# Patient Record
Sex: FEMALE | Race: BLACK OR AFRICAN AMERICAN | NOT HISPANIC OR LATINO | Employment: FULL TIME | URBAN - METROPOLITAN AREA
[De-identification: names, ages, dates, MRNs, and addresses within clinical notes are randomized per-mention and may not be internally consistent; named-entity substitution may affect disease eponyms.]

---

## 2020-11-04 ENCOUNTER — OFFICE VISIT (OUTPATIENT)
Dept: FAMILY MEDICINE CLINIC | Facility: CLINIC | Age: 35
End: 2020-11-04
Payer: COMMERCIAL

## 2020-11-04 VITALS
BODY MASS INDEX: 30.42 KG/M2 | HEART RATE: 86 BPM | OXYGEN SATURATION: 98 % | WEIGHT: 182.6 LBS | DIASTOLIC BLOOD PRESSURE: 82 MMHG | TEMPERATURE: 96.8 F | SYSTOLIC BLOOD PRESSURE: 118 MMHG | HEIGHT: 65 IN

## 2020-11-04 DIAGNOSIS — L72.9 CYST OF SKIN: Primary | ICD-10-CM

## 2020-11-04 DIAGNOSIS — Z00.00 HEALTH CARE MAINTENANCE: ICD-10-CM

## 2020-11-04 DIAGNOSIS — Z13.220 SCREENING FOR HYPERLIPIDEMIA: ICD-10-CM

## 2020-11-04 PROCEDURE — 3725F SCREEN DEPRESSION PERFORMED: CPT | Performed by: FAMILY MEDICINE

## 2020-11-04 PROCEDURE — 99203 OFFICE O/P NEW LOW 30 MIN: CPT | Performed by: FAMILY MEDICINE

## 2020-11-04 RX ORDER — CEPHALEXIN 500 MG/1
500 CAPSULE ORAL EVERY 12 HOURS SCHEDULED
Qty: 20 CAPSULE | Refills: 0 | Status: SHIPPED | OUTPATIENT
Start: 2020-11-04 | End: 2020-11-14

## 2020-11-05 ENCOUNTER — CONSULT (OUTPATIENT)
Dept: SURGERY | Facility: CLINIC | Age: 35
End: 2020-11-05
Payer: COMMERCIAL

## 2020-11-05 VITALS
WEIGHT: 180.6 LBS | SYSTOLIC BLOOD PRESSURE: 120 MMHG | HEIGHT: 65 IN | BODY MASS INDEX: 30.09 KG/M2 | HEART RATE: 112 BPM | DIASTOLIC BLOOD PRESSURE: 60 MMHG | TEMPERATURE: 97.6 F

## 2020-11-05 DIAGNOSIS — L72.9 CYST OF SKIN: ICD-10-CM

## 2020-11-05 PROCEDURE — 1036F TOBACCO NON-USER: CPT | Performed by: PHYSICIAN ASSISTANT

## 2020-11-05 PROCEDURE — 3008F BODY MASS INDEX DOCD: CPT | Performed by: PHYSICIAN ASSISTANT

## 2020-11-05 PROCEDURE — 99243 OFF/OP CNSLTJ NEW/EST LOW 30: CPT | Performed by: PHYSICIAN ASSISTANT

## 2020-12-02 ENCOUNTER — APPOINTMENT (OUTPATIENT)
Dept: LAB | Facility: CLINIC | Age: 35
End: 2020-12-02
Payer: COMMERCIAL

## 2020-12-02 DIAGNOSIS — Z00.00 HEALTH CARE MAINTENANCE: ICD-10-CM

## 2020-12-02 DIAGNOSIS — Z13.220 SCREENING FOR HYPERLIPIDEMIA: ICD-10-CM

## 2020-12-02 LAB
ALBUMIN SERPL BCP-MCNC: 3.6 G/DL (ref 3.5–5)
ALP SERPL-CCNC: 77 U/L (ref 46–116)
ALT SERPL W P-5'-P-CCNC: 20 U/L (ref 12–78)
ANION GAP SERPL CALCULATED.3IONS-SCNC: 3 MMOL/L (ref 4–13)
AST SERPL W P-5'-P-CCNC: 12 U/L (ref 5–45)
BASOPHILS # BLD AUTO: 0.04 THOUSANDS/ΜL (ref 0–0.1)
BASOPHILS NFR BLD AUTO: 1 % (ref 0–1)
BILIRUB SERPL-MCNC: 0.4 MG/DL (ref 0.2–1)
BUN SERPL-MCNC: 11 MG/DL (ref 5–25)
CALCIUM SERPL-MCNC: 8.9 MG/DL (ref 8.3–10.1)
CHLORIDE SERPL-SCNC: 111 MMOL/L (ref 100–108)
CHOLEST SERPL-MCNC: 256 MG/DL (ref 50–200)
CO2 SERPL-SCNC: 27 MMOL/L (ref 21–32)
CREAT SERPL-MCNC: 0.92 MG/DL (ref 0.6–1.3)
EOSINOPHIL # BLD AUTO: 0.08 THOUSAND/ΜL (ref 0–0.61)
EOSINOPHIL NFR BLD AUTO: 2 % (ref 0–6)
ERYTHROCYTE [DISTWIDTH] IN BLOOD BY AUTOMATED COUNT: 13.8 % (ref 11.6–15.1)
GFR SERPL CREATININE-BSD FRML MDRD: 92 ML/MIN/1.73SQ M
GLUCOSE P FAST SERPL-MCNC: 76 MG/DL (ref 65–99)
HCT VFR BLD AUTO: 40.5 % (ref 34.8–46.1)
HDLC SERPL-MCNC: 80 MG/DL
HGB BLD-MCNC: 13.2 G/DL (ref 11.5–15.4)
IMM GRANULOCYTES # BLD AUTO: 0.02 THOUSAND/UL (ref 0–0.2)
IMM GRANULOCYTES NFR BLD AUTO: 0 % (ref 0–2)
LDLC SERPL CALC-MCNC: 138 MG/DL (ref 0–100)
LYMPHOCYTES # BLD AUTO: 2.03 THOUSANDS/ΜL (ref 0.6–4.47)
LYMPHOCYTES NFR BLD AUTO: 44 % (ref 14–44)
MCH RBC QN AUTO: 30.8 PG (ref 26.8–34.3)
MCHC RBC AUTO-ENTMCNC: 32.6 G/DL (ref 31.4–37.4)
MCV RBC AUTO: 94 FL (ref 82–98)
MONOCYTES # BLD AUTO: 0.42 THOUSAND/ΜL (ref 0.17–1.22)
MONOCYTES NFR BLD AUTO: 9 % (ref 4–12)
NEUTROPHILS # BLD AUTO: 2.02 THOUSANDS/ΜL (ref 1.85–7.62)
NEUTS SEG NFR BLD AUTO: 44 % (ref 43–75)
NRBC BLD AUTO-RTO: 0 /100 WBCS
PLATELET # BLD AUTO: 237 THOUSANDS/UL (ref 149–390)
PMV BLD AUTO: 10.9 FL (ref 8.9–12.7)
POTASSIUM SERPL-SCNC: 4.1 MMOL/L (ref 3.5–5.3)
PROT SERPL-MCNC: 7.2 G/DL (ref 6.4–8.2)
RBC # BLD AUTO: 4.29 MILLION/UL (ref 3.81–5.12)
SODIUM SERPL-SCNC: 141 MMOL/L (ref 136–145)
T4 FREE SERPL-MCNC: 0.94 NG/DL (ref 0.76–1.46)
TRIGL SERPL-MCNC: 189 MG/DL
TSH SERPL DL<=0.05 MIU/L-ACNC: 1.25 UIU/ML (ref 0.36–3.74)
WBC # BLD AUTO: 4.61 THOUSAND/UL (ref 4.31–10.16)

## 2020-12-02 PROCEDURE — 84439 ASSAY OF FREE THYROXINE: CPT

## 2020-12-02 PROCEDURE — 85025 COMPLETE CBC W/AUTO DIFF WBC: CPT

## 2020-12-02 PROCEDURE — 80061 LIPID PANEL: CPT

## 2020-12-02 PROCEDURE — 36415 COLL VENOUS BLD VENIPUNCTURE: CPT

## 2020-12-02 PROCEDURE — 80053 COMPREHEN METABOLIC PANEL: CPT

## 2020-12-02 PROCEDURE — 84443 ASSAY THYROID STIM HORMONE: CPT

## 2020-12-04 ENCOUNTER — OFFICE VISIT (OUTPATIENT)
Dept: FAMILY MEDICINE CLINIC | Facility: CLINIC | Age: 35
End: 2020-12-04
Payer: COMMERCIAL

## 2020-12-04 VITALS
DIASTOLIC BLOOD PRESSURE: 78 MMHG | HEIGHT: 65 IN | OXYGEN SATURATION: 98 % | RESPIRATION RATE: 17 BRPM | SYSTOLIC BLOOD PRESSURE: 120 MMHG | HEART RATE: 91 BPM | BODY MASS INDEX: 30.92 KG/M2 | TEMPERATURE: 97.4 F | WEIGHT: 185.6 LBS

## 2020-12-04 DIAGNOSIS — E78.5 HYPERLIPIDEMIA, UNSPECIFIED HYPERLIPIDEMIA TYPE: ICD-10-CM

## 2020-12-04 DIAGNOSIS — E66.9 OBESITY (BMI 30-39.9): ICD-10-CM

## 2020-12-04 DIAGNOSIS — Z00.00 HEALTH CARE MAINTENANCE: Primary | ICD-10-CM

## 2020-12-04 PROCEDURE — 99395 PREV VISIT EST AGE 18-39: CPT | Performed by: FAMILY MEDICINE

## 2020-12-04 PROCEDURE — 1036F TOBACCO NON-USER: CPT | Performed by: FAMILY MEDICINE

## 2020-12-04 PROCEDURE — 3008F BODY MASS INDEX DOCD: CPT | Performed by: FAMILY MEDICINE

## 2020-12-14 ENCOUNTER — TELEPHONE (OUTPATIENT)
Dept: FAMILY MEDICINE CLINIC | Facility: CLINIC | Age: 35
End: 2020-12-14

## 2020-12-14 DIAGNOSIS — Z20.822 EXPOSURE TO COVID-19 VIRUS: Primary | ICD-10-CM

## 2020-12-15 DIAGNOSIS — Z20.822 EXPOSURE TO COVID-19 VIRUS: ICD-10-CM

## 2020-12-15 PROCEDURE — U0003 INFECTIOUS AGENT DETECTION BY NUCLEIC ACID (DNA OR RNA); SEVERE ACUTE RESPIRATORY SYNDROME CORONAVIRUS 2 (SARS-COV-2) (CORONAVIRUS DISEASE [COVID-19]), AMPLIFIED PROBE TECHNIQUE, MAKING USE OF HIGH THROUGHPUT TECHNOLOGIES AS DESCRIBED BY CMS-2020-01-R: HCPCS | Performed by: FAMILY MEDICINE

## 2020-12-16 LAB — SARS-COV-2 RNA SPEC QL NAA+PROBE: NOT DETECTED

## 2020-12-28 ENCOUNTER — OFFICE VISIT (OUTPATIENT)
Dept: SURGERY | Facility: CLINIC | Age: 35
End: 2020-12-28
Payer: COMMERCIAL

## 2020-12-28 VITALS
HEIGHT: 65 IN | TEMPERATURE: 97.2 F | SYSTOLIC BLOOD PRESSURE: 120 MMHG | BODY MASS INDEX: 29.82 KG/M2 | WEIGHT: 179 LBS | DIASTOLIC BLOOD PRESSURE: 76 MMHG | HEART RATE: 94 BPM

## 2020-12-28 DIAGNOSIS — L72.3 SEBACEOUS CYST OF LEFT AXILLA: Primary | ICD-10-CM

## 2020-12-28 PROCEDURE — 99213 OFFICE O/P EST LOW 20 MIN: CPT | Performed by: SURGERY

## 2020-12-28 PROCEDURE — 3008F BODY MASS INDEX DOCD: CPT | Performed by: SURGERY

## 2020-12-28 PROCEDURE — 1036F TOBACCO NON-USER: CPT | Performed by: SURGERY

## 2020-12-28 RX ORDER — DIPHENOXYLATE HYDROCHLORIDE AND ATROPINE SULFATE 2.5; .025 MG/1; MG/1
1 TABLET ORAL DAILY
COMMUNITY

## 2021-11-03 ENCOUNTER — TELEPHONE (OUTPATIENT)
Dept: NEUROLOGY | Facility: CLINIC | Age: 36
End: 2021-11-03

## 2022-06-24 ENCOUNTER — TELEPHONE (OUTPATIENT)
Dept: FAMILY MEDICINE CLINIC | Facility: CLINIC | Age: 37
End: 2022-06-24

## 2022-06-24 DIAGNOSIS — Z11.52 ENCOUNTER FOR SCREENING FOR COVID-19: ICD-10-CM

## 2022-06-24 PROCEDURE — U0005 INFEC AGEN DETEC AMPLI PROBE: HCPCS | Performed by: FAMILY MEDICINE

## 2022-06-24 PROCEDURE — U0003 INFECTIOUS AGENT DETECTION BY NUCLEIC ACID (DNA OR RNA); SEVERE ACUTE RESPIRATORY SYNDROME CORONAVIRUS 2 (SARS-COV-2) (CORONAVIRUS DISEASE [COVID-19]), AMPLIFIED PROBE TECHNIQUE, MAKING USE OF HIGH THROUGHPUT TECHNOLOGIES AS DESCRIBED BY CMS-2020-01-R: HCPCS | Performed by: FAMILY MEDICINE

## 2022-06-24 NOTE — TELEPHONE ENCOUNTER
Pt called the office she has confirmed Covid 19 exposure  Last day of exposure was 06/19/22  Prior to the weekend pt hd a cold  Pt's former cold symptoms are getting better pt is no longer congested  Pt has slight cough and mucus  No other symptoms  Pt is not vaccinated  Pt was given cdc guidelines  Pt would like to be tested  Pt will be going to [facility] now and calling from her car  Today is 5th day from exposure  Pt aware results take 24-48 hours  Pt aware insurance may not  cover she may receive a bill of $99 pt expressed verbal understanding  Pt will call if symptoms worsen  Pt advised to self isolate until results return

## 2022-06-25 ENCOUNTER — TELEPHONE (OUTPATIENT)
Dept: OTHER | Facility: OTHER | Age: 37
End: 2022-06-25

## 2022-06-25 LAB — SARS-COV-2 RNA RESP QL NAA+PROBE: POSITIVE

## 2022-06-25 NOTE — TELEPHONE ENCOUNTER
Your test for the novel coronavirus, also known as COVID-19, was positive  The sample showed that the virus was present  Positive COVID-19 test results are reportable to the [place] Department of Health  You may receive a call from trained public health staff to conduct an interview  It is important to answer their call  They will ask you to verify who you are  During the call they will ask you about what symptoms you have, what you did before you got sick, and who you were close to while sick  The health department does this to make sure everyone stays healthy and to reduce the spread of the virus  If you would like to verify if the caller does in fact work in contact tracing, call the [address] [place] [address] at [employer] ([phone])  For additional information, please visit the [employer] website: www health [place] gov     If you have any additional questions, we can schedule a virtual visit for you with a provider or call the [facility] hotline [phone], option 7, for care advice    For additional information, please visit the Coronavirus FAQ on the [facility] home page ([facility]  org)

## 2022-09-15 ENCOUNTER — OFFICE VISIT (OUTPATIENT)
Dept: FAMILY MEDICINE CLINIC | Facility: CLINIC | Age: 37
End: 2022-09-15
Payer: COMMERCIAL

## 2022-09-15 VITALS
DIASTOLIC BLOOD PRESSURE: 72 MMHG | OXYGEN SATURATION: 99 % | TEMPERATURE: 97.4 F | RESPIRATION RATE: 16 BRPM | WEIGHT: 194 LBS | HEIGHT: 66 IN | HEART RATE: 82 BPM | BODY MASS INDEX: 31.18 KG/M2 | SYSTOLIC BLOOD PRESSURE: 124 MMHG

## 2022-09-15 DIAGNOSIS — K92.1 BLOOD IN STOOL: ICD-10-CM

## 2022-09-15 DIAGNOSIS — R19.7 DIARRHEA, UNSPECIFIED TYPE: ICD-10-CM

## 2022-09-15 DIAGNOSIS — K64.9 HEMORRHOIDS, UNSPECIFIED HEMORRHOID TYPE: ICD-10-CM

## 2022-09-15 DIAGNOSIS — K62.5 RECTAL BLEEDING: Primary | ICD-10-CM

## 2022-09-15 PROCEDURE — 3725F SCREEN DEPRESSION PERFORMED: CPT | Performed by: FAMILY MEDICINE

## 2022-09-15 PROCEDURE — 99214 OFFICE O/P EST MOD 30 MIN: CPT | Performed by: FAMILY MEDICINE

## 2022-09-15 NOTE — PATIENT INSTRUCTIONS
Hemorrhoids and rectal bleeding and blood in stool  Rec starting high fiber diet and stay well hydrated  Rec GI consult and also rec to call if any fever or abdominal pain

## 2022-09-15 NOTE — PROGRESS NOTES
Name: Paige Nelson      : 1985      MRN: [id number]  Encounter Provider: Jeana Hooks DO  Encounter Date: 9/15/2022   Encounter department: [address] [place] [address]  Chief Complaint   Patient presents with    Follow-up     Blood when she has a bowel movement started this morning  Pt is not covid vaccinated      Patient Instructions   Hemorrhoids and rectal bleeding and blood in stool  Rec starting high fiber diet and stay well hydrated  Rec GI consult and also rec to call if any fever or abdominal pain  Assessment & Plan     1  Rectal bleeding  -     Ambulatory Referral to Gastroenterology; Future    2  Blood in stool  -     Ambulatory Referral to Gastroenterology; Future    3  Hemorrhoids, unspecified hemorrhoid type  -     Ambulatory Referral to Gastroenterology; Future    4  Diarrhea, unspecified type  -     Ambulatory Referral to Gastroenterology; Future           Subjective      Follow-up (Blood when she has a bowel movement started this morning  Pt is not covid vaccinated ) Patient does have hemorrhoids and come and go  Diarrhea and has been going a lot after taking abx    This is first time ever seeing blood in stool and toilet bowl  She does have hemorrhoids as well  Review of Systems   Constitutional: Negative  HENT: Negative  Eyes: Negative  Respiratory: Negative  Cardiovascular: Negative  Gastrointestinal: Positive for blood in stool and diarrhea  Negative for abdominal pain  Endocrine: Negative  Genitourinary: Negative  Musculoskeletal: Negative  Skin: Negative  Allergic/Immunologic: Negative  Neurological: Negative  Hematological: Negative  Psychiatric/Behavioral: Negative          Current Outpatient Medications on File Prior to Visit   Medication Sig    multivitamin (THERAGRAN) TABS Take 1 tablet by mouth daily       Objective     /72 (BP Location: Left arm, Patient Position: Sitting, Cuff Size: Adult)   Pulse 82   Temp Teri Beard ) 97 4 °F (36 3 °C) (Temporal)   Resp 16   Ht 5' 5 5" (1 664 m)   Wt 88 kg (194 lb)   SpO2 99%   BMI 31 79 kg/m²     Physical Exam  Constitutional:       Appearance: She is well-developed  HENT:      Head: Normocephalic and atraumatic  Right Ear: External ear normal       Left Ear: External ear normal       Nose: Nose normal    Eyes:      Conjunctiva/sclera: Conjunctivae normal       Pupils: Pupils are equal, round, and reactive to light  Cardiovascular:      Rate and Rhythm: Normal rate and regular rhythm  Heart sounds: Normal heart sounds  Pulmonary:      Effort: Pulmonary effort is normal       Breath sounds: Normal breath sounds  Abdominal:      General: Abdomen is flat  Bowel sounds are normal       Palpations: Abdomen is soft  Musculoskeletal:         General: Normal range of motion  Cervical back: Normal range of motion and neck supple  Skin:     General: Skin is warm and dry  Neurological:      Mental Status: She is alert and oriented to person, place, and time  Deep Tendon Reflexes: Reflexes are normal and symmetric     Psychiatric:         Behavior: Behavior normal        Todd Noss, DO

## 2022-09-21 ENCOUNTER — TELEPHONE (OUTPATIENT)
Dept: GASTROENTEROLOGY | Facility: MEDICAL CENTER | Age: 37
End: 2022-09-21

## 2022-09-21 ENCOUNTER — CONSULT (OUTPATIENT)
Dept: GASTROENTEROLOGY | Facility: MEDICAL CENTER | Age: 37
End: 2022-09-21
Payer: COMMERCIAL

## 2022-09-21 VITALS
WEIGHT: 193.4 LBS | BODY MASS INDEX: 31.69 KG/M2 | TEMPERATURE: 99.2 F | HEART RATE: 87 BPM | DIASTOLIC BLOOD PRESSURE: 75 MMHG | SYSTOLIC BLOOD PRESSURE: 115 MMHG

## 2022-09-21 DIAGNOSIS — K92.1 BLOOD IN STOOL: ICD-10-CM

## 2022-09-21 DIAGNOSIS — R19.7 DIARRHEA, UNSPECIFIED TYPE: ICD-10-CM

## 2022-09-21 DIAGNOSIS — K62.5 RECTAL BLEEDING: ICD-10-CM

## 2022-09-21 DIAGNOSIS — R10.13 DYSPEPSIA: Primary | ICD-10-CM

## 2022-09-21 DIAGNOSIS — K64.9 HEMORRHOIDS, UNSPECIFIED HEMORRHOID TYPE: ICD-10-CM

## 2022-09-21 PROCEDURE — 99204 OFFICE O/P NEW MOD 45 MIN: CPT | Performed by: INTERNAL MEDICINE

## 2022-09-21 RX ORDER — HYDROCORTISONE ACETATE 25 MG/1
25 SUPPOSITORY RECTAL 2 TIMES DAILY
Qty: 12 SUPPOSITORY | Refills: 0 | Status: SHIPPED | OUTPATIENT
Start: 2022-09-21

## 2022-09-21 NOTE — PROGRESS NOTES
Outpatient Consultation  [address]  [address]  [facility] [address]  Mandie RODGERS  Ph : [phone]  Fax : [phone]  Mobile : [phone]  Email : [email or web address]  Also available on [employer]      Pa Enciso 42 y o  female MRN: [id number]    PCP: Modesto Reynolds DO  Referring: Modesto Reynolds DO  [address] [facility],  [address] Abraham Enciso was seen in consultation  My recommendations are included  Please do not hesitate to contact me with any questions you may have  ASSESSMENT AND PLAN:      No problem-specific Assessment & Plan notes found for this encounter  Diagnoses and all orders for this visit:    Dyspepsia    Rectal bleeding  -     Ambulatory Referral to Gastroenterology    Blood in stool  -     Ambulatory Referral to Gastroenterology    Hemorrhoids, unspecified hemorrhoid type  -     Ambulatory Referral to Gastroenterology    Diarrhea, unspecified type  -     Ambulatory Referral to Gastroenterology    17-year-old lady who presents to us for evaluation of rectal bleeding  She only had 1 episode  She showed me a picture and it appears to be blood coating the stools suggestive of outlet bleeding such as hemorrhoids  Discussed colonoscopy but she would chose to wait  We also discussed a flexible sigmoidoscopy  We also discussed regarding the increased risk of colon cancer an  population at a younger age  If another episode then absolutely colonoscopy  If no further episodes that have recommended to do a colonoscopy at age 37  For symptoms of dyspepsia I have recommended the following  1  Align probiotic  2  IB Guard  3  Lactose free diet  4  Avoid high fructose corn syrup and artificial sweeteners  5  Anusol HC - suppository twice daily for 5 - 7 days  6  Increase fluid intake - 80 oz  7  Fiber (metamucil)     8  If another episode then absolutely colonoscopy   ______________________________________________________________________    HPI:  41 y/o with h/o rectal bleeding last week  She was on antibiotics (bacterial vaginosis) prior to that which caused her to have diarrhea  She had no straining  She has noticed only one episode of this bleeding  I saw a picture of that and the stools are brown but blood on the outside  No family h/o colon polyps or cancers  She is usually regular with her BM's  No nausea, vomiting, dysphagia or odynophagia  She does have a history of hemorrhoids  She has had some intermittent abdominal discomfort with bloating and gassiness  PRIOR ENDOSCOPIC EVALUATION :     Endoscopy : no    Colonoscopy : no      REVIEW OF SYSTEMS:    CONSTITUTIONAL: Denies any fever, chills, rigors, and weight loss  HEENT: No earache or tinnitus  Denies hearing loss or visual disturbances  CARDIOVASCULAR: No chest pain or palpitations  RESPIRATORY: Denies any cough, hemoptysis, shortness of breath or dyspnea on exertion  GASTROINTESTINAL: As noted in the History of Present Illness  GENITOURINARY: No problems with urination  Denies any hematuria or dysuria  NEUROLOGIC: No dizziness or vertigo, denies headaches  MUSCULOSKELETAL: Denies any muscle or joint pain  SKIN: Denies skin rashes or itching  ENDOCRINE: Denies excessive thirst  Denies intolerance to heat or cold  PSYCHOSOCIAL: Denies depression or anxiety  Denies any recent memory loss  Historical Information   History reviewed  No pertinent past medical history    Past Surgical History:   Procedure Laterality Date    DERMOID CYST  EXCISION      08/2019    HERNIA REPAIR      umbilical cord hernia when patient was 12yrs old in [place] [address]      08/2019     Social History   Social History     Substance and Sexual Activity   Alcohol Use Yes     Social History     Substance and Sexual Activity   Drug Use Never     Social History     Tobacco Use Smoking Status Never Smoker   Smokeless Tobacco Never Used     Family History   Problem Relation Age of Onset    Breast cancer additional onset Maternal Aunt     Heart disease Maternal Aunt        Meds/Allergies       Current Outpatient Medications:     multivitamin (THERAGRAN) TABS    No Known Allergies        Objective     Blood pressure 115/75, pulse 87, temperature 99 2 °F (37 3 °C), temperature source Tympanic, weight 87 7 kg (193 lb 6 4 oz)  Body mass index is 31 69 kg/m²  PHYSICAL EXAM:      Physical Exam  Constitutional:       Appearance: Normal appearance  She is well-developed  HENT:      Head: Normocephalic and atraumatic  Eyes:      General: No scleral icterus  Conjunctiva/sclera: Conjunctivae normal       Pupils: Pupils are equal, round, and reactive to light  Cardiovascular:      Rate and Rhythm: Normal rate and regular rhythm  Heart sounds: Normal heart sounds  Pulmonary:      Effort: Pulmonary effort is normal  No respiratory distress  Breath sounds: Normal breath sounds  Abdominal:      General: Bowel sounds are normal  There is no distension  Palpations: Abdomen is soft  There is no mass  Tenderness: There is no abdominal tenderness  Hernia: No hernia is present  Genitourinary:     Rectum: Normal       Comments: Small hemorrhoids internally  Musculoskeletal:         General: Normal range of motion  Cervical back: Normal range of motion  Lymphadenopathy:      Cervical: No cervical adenopathy  Skin:     General: Skin is warm  Neurological:      Mental Status: She is alert and oriented to person, place, and time  Psychiatric:         Behavior: Behavior normal          Thought Content:  Thought content normal              Lab Results:     Lab Results   Component Value Date    WBC 4 61 12/02/2020    HGB 13 2 12/02/2020    HCT 40 5 12/02/2020    MCV 94 12/02/2020     12/02/2020       Lab Results   Component Value Date    K 4 1 12/02/2020     (H) 12/02/2020    CO2 27 12/02/2020    BUN 11 12/02/2020    CREATININE 0 92 12/02/2020    GLUF 76 12/02/2020    CALCIUM 8 9 12/02/2020    AST 12 12/02/2020    ALT 20 12/02/2020    ALKPHOS 77 12/02/2020    EGFR 92 12/02/2020       No results found for: INR, PROTIME      Radiology Results:   No results found

## 2022-09-21 NOTE — PATIENT INSTRUCTIONS
Align probiotic  IB Guard  Lactose free diet  Avoid high fructose corn syrup and artificial sweeteners  Anusol HC - suppository twice daily for 5 - 7 days  Increase fluid intake - 80 oz  Fiber (metamucil)  If another episode then absolutely colonoscopy

## 2023-03-30 ENCOUNTER — HOSPITAL ENCOUNTER (EMERGENCY)
Facility: HOSPITAL | Age: 38
Discharge: HOME | End: 2023-03-30
Attending: STUDENT IN AN ORGANIZED HEALTH CARE EDUCATION/TRAINING PROGRAM

## 2023-03-30 VITALS
HEART RATE: 99 BPM | BODY MASS INDEX: 31.4 KG/M2 | DIASTOLIC BLOOD PRESSURE: 73 MMHG | RESPIRATION RATE: 18 BRPM | WEIGHT: 191.6 LBS | SYSTOLIC BLOOD PRESSURE: 136 MMHG | OXYGEN SATURATION: 100 % | TEMPERATURE: 98.4 F

## 2023-03-30 DIAGNOSIS — R51.9 HEADACHE: ICD-10-CM
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
## 2023-03-30 RX ORDER — ACETAMINOPHEN 325 MG/1
650 TABLET ORAL ONCE
Status: COMPLETED | OUTPATIENT
Start: 2023-03-30 | End: 2023-03-30

## 2023-03-30 RX ADMIN — ACETAMINOPHEN 650 MG: 325 TABLET ORAL at 15:35
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
## 2023-08-14 ENCOUNTER — HOSPITAL ENCOUNTER (OUTPATIENT)
Dept: MAMMOGRAPHY | Facility: CLINIC | Age: 38
Discharge: HOME | End: 2023-08-14
Payer: COMMERCIAL

## 2023-08-14 VITALS — WEIGHT: 191 LBS | BODY MASS INDEX: 30.7 KG/M2 | HEIGHT: 66 IN

## 2023-08-14 DIAGNOSIS — Z12.31 ENCOUNTER FOR SCREENING MAMMOGRAM FOR MALIGNANT NEOPLASM OF BREAST: ICD-10-CM

## 2023-08-14 PROCEDURE — 77067 SCR MAMMO BI INCL CAD: CPT

## 2023-08-14 PROCEDURE — 77063 BREAST TOMOSYNTHESIS BI: CPT

## 2024-03-26 ENCOUNTER — NURSE TRIAGE (OUTPATIENT)
Age: 39
End: 2024-03-26

## 2024-03-26 NOTE — TELEPHONE ENCOUNTER
"Regarding: Chest feels funny  ----- Message from Ashlee Toney sent at 3/26/2024  1:47 PM EDT -----  Patient feels like she has something stuck in her chest. Not in pain, no issues breathing. Her chest area feels \"off\". No other symptoms. A week or 2 ago she felt like she was getting a cold but then did not get sick.    "
How Many Skin Cancers Have You Had?: one
Reason for Disposition  • Third attempt to contact caller AND no contact made. Phone number verified.    Protocols used: No Contact or Duplicate Contact Call-ADULT-[place]    
Unable to make contact with pt either. Attempted to leave to [facility]'s but was cut off each time   
What Is The Reason For Today's Visit?: History of Non-Melanoma Skin Cancer

## 2024-04-01 ENCOUNTER — APPOINTMENT (OUTPATIENT)
Dept: RADIOLOGY | Facility: MEDICAL CENTER | Age: 39
End: 2024-04-01
Payer: COMMERCIAL

## 2024-04-01 ENCOUNTER — APPOINTMENT (OUTPATIENT)
Dept: LAB | Facility: CLINIC | Age: 39
End: 2024-04-01
Payer: COMMERCIAL

## 2024-04-01 ENCOUNTER — OFFICE VISIT (OUTPATIENT)
Dept: FAMILY MEDICINE CLINIC | Facility: CLINIC | Age: 39
End: 2024-04-01
Payer: COMMERCIAL

## 2024-04-01 VITALS
DIASTOLIC BLOOD PRESSURE: 80 MMHG | HEIGHT: 66 IN | WEIGHT: 207.2 LBS | SYSTOLIC BLOOD PRESSURE: 110 MMHG | BODY MASS INDEX: 33.3 KG/M2 | OXYGEN SATURATION: 99 % | HEART RATE: 110 BPM

## 2024-04-01 DIAGNOSIS — E66.09 CLASS 1 OBESITY DUE TO EXCESS CALORIES IN ADULT, UNSPECIFIED BMI, UNSPECIFIED WHETHER SERIOUS COMORBIDITY PRESENT: ICD-10-CM

## 2024-04-01 DIAGNOSIS — R13.10 DYSPHAGIA, UNSPECIFIED TYPE: ICD-10-CM

## 2024-04-01 DIAGNOSIS — R13.10 DYSPHAGIA, UNSPECIFIED TYPE: Primary | ICD-10-CM

## 2024-04-01 DIAGNOSIS — R07.9 CHEST PAIN, UNSPECIFIED TYPE: ICD-10-CM

## 2024-04-01 DIAGNOSIS — Z13.220 SCREENING FOR HYPERLIPIDEMIA: ICD-10-CM

## 2024-04-01 LAB
ALBUMIN SERPL BCP-MCNC: 4 G/DL (ref 3.5–5)
ALP SERPL-CCNC: 68 U/L (ref 34–104)
ALT SERPL W P-5'-P-CCNC: 17 U/L (ref 7–52)
ANION GAP SERPL CALCULATED.3IONS-SCNC: 9 MMOL/L (ref 4–13)
AST SERPL W P-5'-P-CCNC: 19 U/L (ref 13–39)
BASOPHILS # BLD AUTO: 0.04 THOUSANDS/ÂΜL (ref 0–0.1)
BASOPHILS NFR BLD AUTO: 1 % (ref 0–1)
BILIRUB SERPL-MCNC: 0.59 MG/DL (ref 0.2–1)
BUN SERPL-MCNC: 14 MG/DL (ref 5–25)
CALCIUM SERPL-MCNC: 8.7 MG/DL (ref 8.4–10.2)
CHLORIDE SERPL-SCNC: 105 MMOL/L (ref 96–108)
CHOLEST SERPL-MCNC: 270 MG/DL
CO2 SERPL-SCNC: 23 MMOL/L (ref 21–32)
CREAT SERPL-MCNC: 0.9 MG/DL (ref 0.6–1.3)
EOSINOPHIL # BLD AUTO: 0.05 THOUSAND/ÂΜL (ref 0–0.61)
EOSINOPHIL NFR BLD AUTO: 1 % (ref 0–6)
ERYTHROCYTE [DISTWIDTH] IN BLOOD BY AUTOMATED COUNT: 13.5 % (ref 11.6–15.1)
GFR SERPL CREATININE-BSD FRML MDRD: 79 ML/MIN/1.73SQ M
GLUCOSE P FAST SERPL-MCNC: 88 MG/DL (ref 65–99)
HCT VFR BLD AUTO: 44.9 % (ref 34.8–46.1)
HDLC SERPL-MCNC: 81 MG/DL
HGB BLD-MCNC: 14.7 G/DL (ref 11.5–15.4)
IMM GRANULOCYTES # BLD AUTO: 0.03 THOUSAND/UL (ref 0–0.2)
IMM GRANULOCYTES NFR BLD AUTO: 1 % (ref 0–2)
LDLC SERPL CALC-MCNC: 181 MG/DL (ref 0–100)
LYMPHOCYTES # BLD AUTO: 1.97 THOUSANDS/ÂΜL (ref 0.6–4.47)
LYMPHOCYTES NFR BLD AUTO: 39 % (ref 14–44)
MCH RBC QN AUTO: 30.8 PG (ref 26.8–34.3)
MCHC RBC AUTO-ENTMCNC: 32.7 G/DL (ref 31.4–37.4)
MCV RBC AUTO: 94 FL (ref 82–98)
MONOCYTES # BLD AUTO: 0.41 THOUSAND/ÂΜL (ref 0.17–1.22)
MONOCYTES NFR BLD AUTO: 8 % (ref 4–12)
NEUTROPHILS # BLD AUTO: 2.62 THOUSANDS/ÂΜL (ref 1.85–7.62)
NEUTS SEG NFR BLD AUTO: 50 % (ref 43–75)
NRBC BLD AUTO-RTO: 0 /100 WBCS
PLATELET # BLD AUTO: 229 THOUSANDS/UL (ref 149–390)
PMV BLD AUTO: 11.5 FL (ref 8.9–12.7)
POTASSIUM SERPL-SCNC: 4.3 MMOL/L (ref 3.5–5.3)
PROT SERPL-MCNC: 7.3 G/DL (ref 6.4–8.4)
RBC # BLD AUTO: 4.77 MILLION/UL (ref 3.81–5.12)
SODIUM SERPL-SCNC: 137 MMOL/L (ref 135–147)
TRIGL SERPL-MCNC: 42 MG/DL
WBC # BLD AUTO: 5.12 THOUSAND/UL (ref 4.31–10.16)

## 2024-04-01 PROCEDURE — 80061 LIPID PANEL: CPT

## 2024-04-01 PROCEDURE — 99214 OFFICE O/P EST MOD 30 MIN: CPT | Performed by: FAMILY MEDICINE

## 2024-04-01 PROCEDURE — 85025 COMPLETE CBC W/AUTO DIFF WBC: CPT

## 2024-04-01 PROCEDURE — 36415 COLL VENOUS BLD VENIPUNCTURE: CPT

## 2024-04-01 PROCEDURE — 80053 COMPREHEN METABOLIC PANEL: CPT

## 2024-04-01 PROCEDURE — 71046 X-RAY EXAM CHEST 2 VIEWS: CPT

## 2024-04-01 RX ORDER — OMEPRAZOLE 20 MG/1
20 CAPSULE, DELAYED RELEASE ORAL
Qty: 30 CAPSULE | Refills: 5 | Status: SHIPPED | OUTPATIENT
Start: 2024-04-01 | End: 2024-09-28

## 2024-04-01 RX ORDER — FOLIC ACID 1 MG/1
1000 TABLET ORAL DAILY
COMMUNITY
Start: 2024-02-04

## 2024-04-01 NOTE — PROGRESS NOTES
Chief Complaint   Patient presents with    Headache    Chest Pain     Patient states something is stuck in her chest. No cold in the past three weeks since chest situation began.      Patient Instructions   Here for dysphagia and will rec labs and cxray and trial of omeprazole once daily until ressen in 2-3 weeks. Call if worse. Lose weight to get BMI lower than 25.   Assessment/Plan:    No problem-specific Assessment & Plan notes found for this encounter.       Diagnoses and all orders for this visit:    Dysphagia, unspecified type  -     omeprazole (PriLOSEC) 20 mg delayed release capsule; Take 1 capsule (20 mg total) by mouth daily before breakfast  -     XR chest pa & lateral; Future  -     Cancel: Comprehensive metabolic panel; Future  -     Cancel: CBC and differential; Future  -     Cancel: Lipid Panel with Direct LDL reflex; Future  -     CBC and differential; Future  -     Comprehensive metabolic panel; Future  -     Lipid Panel with Direct LDL reflex; Future    Chest pain, unspecified type  -     omeprazole (PriLOSEC) 20 mg delayed release capsule; Take 1 capsule (20 mg total) by mouth daily before breakfast  -     XR chest pa & lateral; Future  -     Cancel: Comprehensive metabolic panel; Future  -     Cancel: CBC and differential; Future  -     Cancel: Lipid Panel with Direct LDL reflex; Future  -     CBC and differential; Future  -     Comprehensive metabolic panel; Future  -     Lipid Panel with Direct LDL reflex; Future    Class 1 obesity due to excess calories in adult, unspecified BMI, unspecified whether serious comorbidity present  -     Cancel: Comprehensive metabolic panel; Future  -     Cancel: CBC and differential; Future  -     Cancel: Lipid Panel with Direct LDL reflex; Future  -     CBC and differential; Future  -     Comprehensive metabolic panel; Future  -     Lipid Panel with Direct LDL reflex; Future    Screening for hyperlipidemia  -     Cancel: Comprehensive metabolic panel; Future  -  "    Cancel: Lipid Panel with Direct LDL reflex; Future  -     Comprehensive metabolic panel; Future  -     Lipid Panel with Direct LDL reflex; Future    Other orders  -     folic acid (FOLVITE) 1 mg tablet; Take 1,000 mcg by mouth daily          Subjective:      Patient ID: Nunu Mcdonald is a 39 y.o. female.    Headache  Chest Pain (Patient states something is stuck in her chest. No cold in the past three weeks since chest situation began. )  Possible dysphagia which resolved.     Headache  Chest Pain   Pertinent negatives include no headaches.       The following portions of the patient's history were reviewed and updated as appropriate: allergies, current medications, past family history, past medical history, past social history, past surgical history, and problem list.    Review of Systems   Constitutional: Negative.    HENT: Negative.     Eyes: Negative.    Cardiovascular:  Positive for chest pain.   Gastrointestinal: Negative.    Endocrine: Negative.    Genitourinary: Negative.    Musculoskeletal: Negative.    Skin: Negative.    Allergic/Immunologic: Negative.    Neurological:  Negative for headaches.   Hematological: Negative.    Psychiatric/Behavioral: Negative.           Objective:      /80   Pulse (!) 110   Ht 5' 5.5\" (1.664 m)   Wt 94 kg (207 lb 3.2 oz)   SpO2 99%   BMI 33.96 kg/m²          Physical Exam  Constitutional:       Appearance: She is well-developed.   HENT:      Head: Normocephalic and atraumatic.      Right Ear: External ear normal.      Left Ear: External ear normal.      Nose: Nose normal.   Eyes:      Conjunctiva/sclera: Conjunctivae normal.      Pupils: Pupils are equal, round, and reactive to light.   Cardiovascular:      Rate and Rhythm: Normal rate and regular rhythm.      Heart sounds: Normal heart sounds.   Pulmonary:      Effort: Pulmonary effort is normal.      Breath sounds: Normal breath sounds.   Abdominal:      General: Bowel sounds are normal.      Palpations: " Abdomen is soft.   Musculoskeletal:         General: Normal range of motion.      Cervical back: Normal range of motion and neck supple.   Skin:     General: Skin is warm and dry.      Capillary Refill: Capillary refill takes less than 2 seconds.   Neurological:      General: No focal deficit present.      Mental Status: She is alert and oriented to person, place, and time.      Deep Tendon Reflexes: Reflexes are normal and symmetric.   Psychiatric:         Mood and Affect: Mood normal.         Behavior: Behavior normal.

## 2024-04-01 NOTE — PATIENT INSTRUCTIONS
Here for dysphagia and will rec labs and cxray and trial of omeprazole once daily until ressen in 2-3 weeks. Call if worse. Lose weight to get BMI lower than 25.

## 2024-04-22 ENCOUNTER — OFFICE VISIT (OUTPATIENT)
Dept: FAMILY MEDICINE CLINIC | Facility: CLINIC | Age: 39
End: 2024-04-22
Payer: COMMERCIAL

## 2024-04-22 VITALS
DIASTOLIC BLOOD PRESSURE: 72 MMHG | HEART RATE: 103 BPM | SYSTOLIC BLOOD PRESSURE: 114 MMHG | OXYGEN SATURATION: 99 % | HEIGHT: 66 IN | WEIGHT: 208.4 LBS | TEMPERATURE: 97 F | BODY MASS INDEX: 33.49 KG/M2

## 2024-04-22 DIAGNOSIS — R05.9 COUGH, UNSPECIFIED TYPE: ICD-10-CM

## 2024-04-22 DIAGNOSIS — R10.13 DYSPEPSIA: ICD-10-CM

## 2024-04-22 DIAGNOSIS — R06.2 WHEEZE: Primary | ICD-10-CM

## 2024-04-22 DIAGNOSIS — E78.5 HYPERLIPIDEMIA, UNSPECIFIED HYPERLIPIDEMIA TYPE: Primary | ICD-10-CM

## 2024-04-22 DIAGNOSIS — R09.89 CHEST CONGESTION: ICD-10-CM

## 2024-04-22 DIAGNOSIS — E78.5 HYPERLIPIDEMIA, UNSPECIFIED HYPERLIPIDEMIA TYPE: ICD-10-CM

## 2024-04-22 PROCEDURE — 99214 OFFICE O/P EST MOD 30 MIN: CPT | Performed by: FAMILY MEDICINE

## 2024-04-22 NOTE — PATIENT INSTRUCTIONS
Here for chronic irritation when breathing and wheezing. Hx of dyspepsia. NO issues with swallowing food. Check CT scan lungs. Patient admits to no relief from Prilosec. Consider Pulmonary etiology to symptoms over GI cause. Patient did see GI in past. No mucous production. At times when laughing she has to cough. Low cholesterol diet encouraged and try diet trial and recheck lipids and cmp in 3 months and if not better rec statin.

## 2024-04-22 NOTE — PROGRESS NOTES
Chief Complaint   Patient presents with    Follow-up     3 week follow up. Ongoing symptoms.      Patient Instructions   Here for chronic irritation when breathing and wheezing. Hx of dyspepsia. NO issues with swallowing food. Check CT scan lungs. Patient admits to no relief from Prilosec. Consider Pulmonary etiology to symptoms over GI cause. Patient did see GI in past. No mucous production. At times when laughing she has to cough. Low cholesterol diet encouraged and try diet trial and recheck lipids and cmp in 3 months and if not better rec statin.   Assessment/Plan:    No problem-specific Assessment & Plan notes found for this encounter.       Diagnoses and all orders for this visit:    Wheeze  -     Ambulatory Referral to Pulmonology; Future  -     CT chest wo contrast; Future    Chest congestion  -     Ambulatory Referral to Pulmonology; Future  -     CT chest wo contrast; Future    Dyspepsia    Cough, unspecified type  -     Ambulatory Referral to Pulmonology; Future  -     CT chest wo contrast; Future    Hyperlipidemia, unspecified hyperlipidemia type          Subjective:      Patient ID: Nunu Mcdonald is a 39 y.o. female.    Here for recheck and prilosec did not help with symptoms of irritation in the med chest when breathing and may cause cough when laughing. Feels she wheezes at times also with laughing to hard.         The following portions of the patient's history were reviewed and updated as appropriate: allergies, current medications, past family history, past medical history, past social history, past surgical history, and problem list.    Review of Systems   Constitutional: Negative.    HENT: Negative.     Eyes: Negative.    Respiratory:  Positive for cough and wheezing.         Feels like chest is irritated when breathing   Cardiovascular: Negative.    Gastrointestinal: Negative.    Endocrine: Negative.    Genitourinary: Negative.    Musculoskeletal: Negative.    Skin: Negative.   "  Allergic/Immunologic: Negative.    Neurological: Negative.    Hematological: Negative.    Psychiatric/Behavioral: Negative.           Objective:      /72   Pulse 103   Temp (!) 97 °F (36.1 °C) (Temporal)   Ht 5' 5.5\" (1.664 m)   Wt 94.5 kg (208 lb 6.4 oz)   SpO2 99%   BMI 34.15 kg/m²          Physical Exam  Constitutional:       Appearance: She is well-developed. She is obese.   HENT:      Head: Normocephalic and atraumatic.      Right Ear: External ear normal.      Left Ear: External ear normal.      Nose: Nose normal.      Mouth/Throat:      Mouth: Mucous membranes are moist.   Eyes:      Conjunctiva/sclera: Conjunctivae normal.      Pupils: Pupils are equal, round, and reactive to light.   Cardiovascular:      Rate and Rhythm: Normal rate and regular rhythm.      Pulses: Normal pulses.      Heart sounds: Normal heart sounds.   Pulmonary:      Effort: Pulmonary effort is normal.      Breath sounds: Normal breath sounds.   Musculoskeletal:         General: Normal range of motion.      Cervical back: Normal range of motion and neck supple.   Skin:     General: Skin is warm and dry.      Capillary Refill: Capillary refill takes less than 2 seconds.   Neurological:      General: No focal deficit present.      Mental Status: She is alert and oriented to person, place, and time. Mental status is at baseline.      Deep Tendon Reflexes: Reflexes are normal and symmetric.   Psychiatric:         Mood and Affect: Mood normal.         Behavior: Behavior normal.         Thought Content: Thought content normal.         Judgment: Judgment normal.            "

## 2024-04-23 ENCOUNTER — TELEPHONE (OUTPATIENT)
Age: 39
End: 2024-04-23

## 2024-04-25 ENCOUNTER — OFFICE VISIT (OUTPATIENT)
Dept: PULMONOLOGY | Facility: CLINIC | Age: 39
End: 2024-04-25
Payer: COMMERCIAL

## 2024-04-25 VITALS
SYSTOLIC BLOOD PRESSURE: 116 MMHG | HEART RATE: 94 BPM | BODY MASS INDEX: 33.11 KG/M2 | WEIGHT: 206 LBS | DIASTOLIC BLOOD PRESSURE: 80 MMHG | OXYGEN SATURATION: 98 % | HEIGHT: 66 IN | TEMPERATURE: 100.1 F

## 2024-04-25 DIAGNOSIS — R06.2 WHEEZING: ICD-10-CM

## 2024-04-25 DIAGNOSIS — R09.82 POSTNASAL DRIP: ICD-10-CM

## 2024-04-25 DIAGNOSIS — R09.89 CHEST CONGESTION: ICD-10-CM

## 2024-04-25 DIAGNOSIS — R05.9 COUGH, UNSPECIFIED TYPE: ICD-10-CM

## 2024-04-25 DIAGNOSIS — R06.2 WHEEZE: ICD-10-CM

## 2024-04-25 DIAGNOSIS — R06.09 DOE (DYSPNEA ON EXERTION): Primary | ICD-10-CM

## 2024-04-25 PROCEDURE — 99204 OFFICE O/P NEW MOD 45 MIN: CPT | Performed by: INTERNAL MEDICINE

## 2024-04-25 RX ORDER — FLUTICASONE PROPIONATE 50 MCG
1 SPRAY, SUSPENSION (ML) NASAL DAILY
Qty: 16 G | Refills: 5 | Status: SHIPPED | OUTPATIENT
Start: 2024-04-25

## 2024-04-25 RX ORDER — ALBUTEROL SULFATE AND BUDESONIDE 90; 80 UG/1; UG/1
1 AEROSOL, METERED RESPIRATORY (INHALATION) 4 TIMES DAILY PRN
Qty: 10.7 G | Refills: 0 | Status: SHIPPED | COMMUNITY
Start: 2024-04-25

## 2024-04-25 NOTE — PROGRESS NOTES
Consultation - Pulmonary Medicine   Nunu Mcdonald 39 y.o. female MRN: [id number]    Physician Requesting Consult: Jose Cruz DO    Reason for Consult: Chest irritation    DE LA PAZ (dyspnea on exertion)  She has dyspnea on exertion and this strange sensation in her airways, and she mentioned wheezing and chest x-ray showed hyperinflation I believe patient might have asthma.  It would be mild intermittent.  I decided to give her airsupra samples to try as needed and see if those symptoms improve and also to check PFTs.    In the future if she continues to have that sensation I may consider bronchoscopy.  Also I see that her PCP ordered chest CT scan and I will follow on results.    Wheezing  Mainly at night could be related to asthma or to postnasal drips.    Postnasal drip  Start Flonase nightly.      Return in about 6 weeks (around 6/6/2024).    Diagnoses and all orders for this visit:    DE LA PAZ (dyspnea on exertion)  -     Complete PFT with post bronchodilator; Future  -     Albuterol-Budesonide (Airsupra) 90-80 MCG/ACT AERO; Inhale 1 puff 4 (four) times a day as needed (SOB, wheezing)    Wheeze  -     Ambulatory Referral to Pulmonology    Chest congestion  -     Ambulatory Referral to Pulmonology  -     fluticasone (FLONASE) 50 mcg/act nasal spray; 1 spray into each nostril daily    Cough, unspecified type  -     Ambulatory Referral to Pulmonology  -     Complete PFT with post bronchodilator; Future  -     fluticasone (FLONASE) 50 mcg/act nasal spray; 1 spray into each nostril daily    Wheezing  -     Complete PFT with post bronchodilator; Future  -     Albuterol-Budesonide (Airsupra) 90-80 MCG/ACT AERO; Inhale 1 puff 4 (four) times a day as needed (SOB, wheezing)    Postnasal drip      ______________________________________________________________________    HPI:    Nunu Mcdonald is a 39 y.o. female who presents for evaluation of vague respiratory symptoms started just more than a month ago  Patient does not  recall any recent sickness, started to feel this vague sensation she describes as irritating breathing in her chest as if there is something that she cannot clear and she can feel it when she takes deep breath.  She has mild congestion but does not have cough or sputum production.  She has mild dyspnea on exertion with climbing steps but no significant symptoms.  Denies chest pain or tightness.  Sometimes she has mild wheezing at night and she also has postnasal drip.  History of asthma.  She has few cousins with asthma.    She never smoked cigarettes, lives with her family, no pets or exposure to birds, no vaping or other illicit drugs.  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        Review of Systems:  Review of Systems   Constitutional: Negative.    HENT:  Positive for postnasal drip.    Eyes: Negative.    Respiratory:  Positive for wheezing.    Cardiovascular: Negative.    Gastrointestinal: Negative.    Endocrine: Negative.    Genitourinary: Negative.    Musculoskeletal: Negative.    Skin: Negative.    Allergic/Immunologic: Negative.    Neurological: Negative.    Hematological: Negative.    Psychiatric/Behavioral: Negative.       Aside from what is mentioned in the HPI, the review of systems otherwise negative.    Current Medications:    Current Outpatient Medications:     folic acid (FOLVITE) 1 mg tablet, Take 1,000 mcg by mouth daily, Disp: , Rfl:     multivitamin (THERAGRAN) TABS, Take 1 tablet by mouth daily, Disp: , Rfl:     hydrocortisone (ANUSOL-HC) 25 mg suppository, Insert 1 suppository (25 mg total) into the rectum 2 (two) times a day (Patient not taking: Reported on 4/22/2024), Disp: 12 suppository, Rfl: 0    omeprazole (PriLOSEC) 20 mg delayed release capsule, Take 1 capsule (20 mg total) by mouth daily before breakfast (Patient not taking: Reported on 4/25/2024), Disp: 30 capsule, Rfl: 5    Historical Information   History reviewed. No pertinent past medical history.  Past  "Surgical History:   Procedure Laterality Date    DERMOID CYST  EXCISION      08/2019    HERNIA REPAIR      umbilical cord hernia when patient was 13yrs old in 1998    MYOMECTOMY      08/2019     Social History   Social History     Tobacco Use   Smoking Status Never   Smokeless Tobacco Never       Occupational history:  No occupational exposure    Family History:   Family History   Problem Relation Age of Onset    No Known Problems Mother     No Known Problems Father     No Known Problems Sister     No Known Problems Sister     No Known Problems Maternal Grandmother     No Known Problems Maternal Grandfather     No Known Problems Paternal Grandmother     No Known Problems Paternal Grandfather     Breast cancer Maternal Aunt 48    Heart disease Maternal Aunt     No Known Problems Maternal Aunt     No Known Problems Paternal Aunt     No Known Problems Paternal Aunt     No Known Problems Paternal Aunt     No Known Problems Paternal Aunt     No Known Problems Paternal Aunt          PhysicalExamination:  Vitals:   /80 (BP Location: Left arm, Patient Position: Sitting, Cuff Size: Standard)   Pulse 94   Temp 100.1 °F (37.8 °C) (Tympanic)   Ht 5' 5.5\" (1.664 m)   Wt 93.4 kg (206 lb)   SpO2 98%   BMI 33.76 kg/m²     Gen:  Comfortable on room air.  No conversational dyspnea  HEENT:  Conjugate gaze.  sclerae anicteric.  Oropharynx moist  Neck: Trachea is midline. No JVD. No adenopathy  Chest: Equal breath sounds and clear to auscultation bilaterally, no wheezing or crackles  Cardiac: S1-S2 regular. no murmur  Abdomen:  benign  Extremities: No edema  Neuro:  Normal speech and mentation      Diagnostic Data:  Labs:  I personally reviewed the most recent laboratory data pertinent to today's visit    Lab Results   Component Value Date    WBC 5.12 04/01/2024    HGB 14.7 04/01/2024    HCT 44.9 04/01/2024    MCV 94 04/01/2024     04/01/2024     Lab Results   Component Value Date    CALCIUM 8.7 04/01/2024    K 4.3 " "04/01/2024    CO2 23 04/01/2024     04/01/2024    BUN 14 04/01/2024    CREATININE 0.90 04/01/2024     No results found for: \"IGE\"  Lab Results   Component Value Date    ALT 17 04/01/2024    AST 19 04/01/2024    ALKPHOS 68 04/01/2024       Imaging:  I personally reviewed the images on the PAC system pertinent to today's visit  Chest x-ray reviewed on PACS: Clear but hyperinflated lungs.        Sarah Roberts MD  "

## 2024-04-25 NOTE — ASSESSMENT & PLAN NOTE
She has dyspnea on exertion and this strange sensation in her airways, and she mentioned wheezing and chest x-ray showed hyperinflation I believe patient might have asthma.  It would be mild intermittent.  I decided to give her airsupra samples to try as needed and see if those symptoms improve and also to check PFTs.    In the future if she continues to have that sensation I may consider bronchoscopy.  Also I see that her PCP ordered chest CT scan and I will follow on results.

## 2024-05-01 ENCOUNTER — TELEPHONE (OUTPATIENT)
Age: 39
End: 2024-05-01

## 2024-05-01 NOTE — TELEPHONE ENCOUNTER
Patient is requesting an insurance referral for the following specialty:      Test Name / Order Name:   CT chest wo contrast     DX Code: R06.2, R09.89, R05.9    Date Of Service: not scheduled at this time (waiting for insurance approval)    Location/Facility Name/Address/Phone #: [facility], [address] [place] [address]    Location / Facility NPI: [id number]    Best Phone # To Reach The Patient:  [phone]    Please return patients call when insurance referral is returned so she can re-schedule her CT scan.

## 2024-05-16 ENCOUNTER — TELEPHONE (OUTPATIENT)
Age: 39
End: 2024-05-16

## 2024-05-16 NOTE — TELEPHONE ENCOUNTER
Patient called, requesting return phone call regarding PRIOR AUTHORIZATION appeal for the CT scan that was ordered by the provider on 4/22/2024.  Initial authorization was denied, patient is kindly asking that the provider either complete a peer to peer or submit the required documentation as requested by her insurance (media file - letter 5/6/2024).  She is still having the symptoms and would have to have this test done as soon as possible. If test is not approved, what are the other  options?    Patient provided a phone# to call to initiate the auth.  [phone].  Ins. Referral was initiated.     Kindly call patient patient back with an update.

## 2024-05-21 ENCOUNTER — HOSPITAL ENCOUNTER (OUTPATIENT)
Dept: PULMONOLOGY | Facility: HOSPITAL | Age: 39
Discharge: HOME | End: 2024-05-21
Attending: INTERNAL MEDICINE
Payer: COMMERCIAL

## 2024-05-21 DIAGNOSIS — R06.09 DOE (DYSPNEA ON EXERTION): ICD-10-CM

## 2024-05-21 DIAGNOSIS — R06.2 WHEEZING: ICD-10-CM

## 2024-05-21 DIAGNOSIS — R05.9 COUGH, UNSPECIFIED TYPE: ICD-10-CM

## 2024-05-21 PROCEDURE — 94060 EVALUATION OF WHEEZING: CPT

## 2024-05-21 PROCEDURE — 94726 PLETHYSMOGRAPHY LUNG VOLUMES: CPT | Performed by: STUDENT IN AN ORGANIZED HEALTH CARE EDUCATION/TRAINING PROGRAM

## 2024-05-21 PROCEDURE — 94729 DIFFUSING CAPACITY: CPT | Performed by: STUDENT IN AN ORGANIZED HEALTH CARE EDUCATION/TRAINING PROGRAM

## 2024-05-21 PROCEDURE — 94760 N-INVAS EAR/PLS OXIMETRY 1: CPT

## 2024-05-21 PROCEDURE — 94729 DIFFUSING CAPACITY: CPT

## 2024-05-21 PROCEDURE — 94060 EVALUATION OF WHEEZING: CPT | Performed by: STUDENT IN AN ORGANIZED HEALTH CARE EDUCATION/TRAINING PROGRAM

## 2024-05-21 PROCEDURE — 94726 PLETHYSMOGRAPHY LUNG VOLUMES: CPT

## 2024-05-21 RX ORDER — ALBUTEROL SULFATE 2.5 MG/3ML
2.5 SOLUTION RESPIRATORY (INHALATION) ONCE AS NEEDED
Status: COMPLETED | OUTPATIENT
Start: 2024-05-21 | End: 2024-05-21

## 2024-05-21 RX ADMIN — ALBUTEROL SULFATE 2.5 MG: 2.5 SOLUTION RESPIRATORY (INHALATION) at 18:20

## 2024-06-24 ENCOUNTER — OFFICE VISIT (OUTPATIENT)
Dept: PULMONOLOGY | Facility: CLINIC | Age: 39
End: 2024-06-24
Payer: COMMERCIAL

## 2024-06-24 VITALS
TEMPERATURE: 98.2 F | SYSTOLIC BLOOD PRESSURE: 100 MMHG | OXYGEN SATURATION: 98 % | HEART RATE: 98 BPM | WEIGHT: 211 LBS | BODY MASS INDEX: 33.91 KG/M2 | DIASTOLIC BLOOD PRESSURE: 64 MMHG | HEIGHT: 66 IN

## 2024-06-24 DIAGNOSIS — R09.82 POSTNASAL DRIP: Primary | ICD-10-CM

## 2024-06-24 DIAGNOSIS — R06.09 DOE (DYSPNEA ON EXERTION): ICD-10-CM

## 2024-06-24 PROBLEM — R06.2 WHEEZING: Status: RESOLVED | Noted: 2024-04-25 | Resolved: 2024-06-24

## 2024-06-24 PROCEDURE — 99213 OFFICE O/P EST LOW 20 MIN: CPT | Performed by: INTERNAL MEDICINE

## 2024-06-24 NOTE — ASSESSMENT & PLAN NOTE
She is doing fine currently, she tried Flonase for 1 week and could not tolerate.  Currently no complaints.

## 2024-06-24 NOTE — ASSESSMENT & PLAN NOTE
This has resolved, PFTs completely normal.  Not sure if that was after a viral infection or due to allergies.  Asthma still a possibility but she improved completely and patient does not want to use any inhalers as she is worried about side effects.  At this point she has no complaints and I told her in the future if her symptoms come back then we will reevaluate but probably she will need some inhalers.

## 2024-06-24 NOTE — PROGRESS NOTES
Progress note - Pulmonary Medicine   Nunu Mcdonald 39 y.o. female MRN: [id number]       Impression & Plan:     DE LA PAZ (dyspnea on exertion)  This has resolved, PFTs completely normal.  Not sure if that was after a viral infection or due to allergies.  Asthma still a possibility but she improved completely and patient does not want to use any inhalers as she is worried about side effects.  At this point she has no complaints and I told her in the future if her symptoms come back then we will reevaluate but probably she will need some inhalers.    Postnasal drip  She is doing fine currently, she tried Flonase for 1 week and could not tolerate.  Currently no complaints.      Return if symptoms worsen or fail to improve.      ______________________________________________________________________    HPI:    Nunu Mcdonald presents today for follow-up of vague respiratory symptoms.  Last visit patient complained of some irritating breathing sensation and some congestion with minimal dyspnea on exertion but no cough or sputum production and she did not have any history of pulmonary disease.  She was treated with Flonase that she used it for 1 week but caused her to have more postnasal drip so she stopped.  Last visit I suspected possible asthma versus post viral infection or environmental allergy, I gave her air supra to use as needed but she did not use because she was worried about the side effects and she improved.    She presents today for follow-up, she feels much better, almost all her symptoms resolved completely and she is back to normal.  She denies chest pain or fever chills or night sweats, denies wheezing, denies cough or sputum production, denies dyspnea on exertion.      Current Medications:    Current Outpatient Medications:     fluticasone (FLONASE) 50 mcg/act nasal spray, 1 spray into each nostril daily, Disp: 16 g, Rfl: 5    folic acid (FOLVITE) 1 mg tablet, Take 1,000 mcg by mouth daily, Disp: , Rfl:      "multivitamin (THERAGRAN) TABS, Take 1 tablet by mouth daily, Disp: , Rfl:     Albuterol-Budesonide (Airsupra) 90-80 MCG/ACT AERO, Inhale 1 puff 4 (four) times a day as needed (SOB, wheezing) (Patient not taking: Reported on 6/24/2024), Disp: 10.7 g, Rfl: 0    hydrocortisone (ANUSOL-HC) 25 mg suppository, Insert 1 suppository (25 mg total) into the rectum 2 (two) times a day (Patient not taking: Reported on 4/22/2024), Disp: 12 suppository, Rfl: 0    omeprazole (PriLOSEC) 20 mg delayed release capsule, Take 1 capsule (20 mg total) by mouth daily before breakfast (Patient not taking: Reported on 4/25/2024), Disp: 30 capsule, Rfl: 5    Review of Systems:  Review of Systems   Constitutional: Negative.    HENT: Negative.     Eyes: Negative.    Respiratory: Negative.     Cardiovascular: Negative.    Gastrointestinal: Negative.    Endocrine: Negative.    Genitourinary: Negative.    Musculoskeletal: Negative.    Skin: Negative.    Allergic/Immunologic: Negative.    Neurological: Negative.    Hematological: Negative.    Psychiatric/Behavioral: Negative.       Aside from what is mentioned in the HPI, the review of systems is otherwise negative    Past medical history, surgical history, and family history were reviewed and updated as appropriate    Social history updates:  Social History     Tobacco Use   Smoking Status Never   Smokeless Tobacco Never       PhysicalExamination:  Vitals:   /64 (BP Location: Left arm, Patient Position: Sitting, Cuff Size: Large)   Pulse 98   Temp 98.2 °F (36.8 °C)   Ht 5' 5.5\" (1.664 m)   Wt 95.7 kg (211 lb)   SpO2 98%   BMI 34.58 kg/m²     Gen:  Comfortable on room air.  No conversational dyspnea  HEENT:  Conjugate gaze.  sclerae anicteric.  Oropharynx moist  Neck: Trachea is midline. No JVD. No adenopathy  Chest: Equal breath sounds and clear to auscultation bilaterally, no wheezing or crackles  Cardiac: S1-S2 regular. no murmur  Abdomen:  benign  Extremities: No edema  Neuro:  " Normal speech and mentation    Diagnostic Data:  Labs:  I personally reviewed the most recent laboratory data pertinent to today's visit    Lab Results   Component Value Date    WBC 5.12 04/01/2024    HGB 14.7 04/01/2024    HCT 44.9 04/01/2024    MCV 94 04/01/2024     04/01/2024     Lab Results   Component Value Date    SODIUM 137 04/01/2024    K 4.3 04/01/2024    CO2 23 04/01/2024     04/01/2024    BUN 14 04/01/2024    CREATININE 0.90 04/01/2024    CALCIUM 8.7 04/01/2024       PFT results:  The most recent pulmonary function tests were reviewed.  Normal spirometry  No bronchodilator response  Lung volumes are normal  DLCO normal  Flow-volume loops are normal    Imaging:  I personally reviewed the images on the PAC system pertinent to today's visit  Chest x-ray reviewed on PACS: Clear lungs      Sarah Roberts MD

## 2024-08-15 ENCOUNTER — HOSPITAL ENCOUNTER (OUTPATIENT)
Dept: RADIOLOGY | Facility: IMAGING CENTER | Age: 39
End: 2024-08-15
Payer: COMMERCIAL

## 2024-08-15 VITALS — WEIGHT: 211 LBS | BODY MASS INDEX: 35.16 KG/M2 | HEIGHT: 65 IN

## 2024-08-15 DIAGNOSIS — Z12.31 ENCOUNTER FOR SCREENING MAMMOGRAM FOR MALIGNANT NEOPLASM OF BREAST: ICD-10-CM

## 2024-08-15 PROCEDURE — 77063 BREAST TOMOSYNTHESIS BI: CPT

## 2024-08-15 PROCEDURE — 77067 SCR MAMMO BI INCL CAD: CPT
